# Patient Record
Sex: FEMALE | Race: AMERICAN INDIAN OR ALASKA NATIVE
[De-identification: names, ages, dates, MRNs, and addresses within clinical notes are randomized per-mention and may not be internally consistent; named-entity substitution may affect disease eponyms.]

---

## 2017-11-10 ENCOUNTER — HOSPITAL ENCOUNTER (EMERGENCY)
Dept: HOSPITAL 5 - ED | Age: 42
Discharge: HOME | End: 2017-11-10
Payer: COMMERCIAL

## 2017-11-10 VITALS — DIASTOLIC BLOOD PRESSURE: 80 MMHG | SYSTOLIC BLOOD PRESSURE: 110 MMHG
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
## 2017-11-10 DIAGNOSIS — F17.200: ICD-10-CM

## 2017-11-10 DIAGNOSIS — S61.212A: Primary | ICD-10-CM
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
## 2017-11-10 PROCEDURE — A6250 SKIN SEAL PROTECT MOISTURIZR: HCPCS

## 2017-11-10 PROCEDURE — 90471 IMMUNIZATION ADMIN: CPT

## 2017-11-10 PROCEDURE — 90715 TDAP VACCINE 7 YRS/> IM: CPT

## 2017-11-10 NOTE — EMERGENCY DEPARTMENT REPORT
ED Laceration HPI





- HPI


Chief Complaint: Extremity Injury, Upper


Stated Complaint: RIGHT FINGER LACERATION


Occurred When: Today


Location: Upper Extremity (right ring finger)


Severity: mild


Tetanus Status: Not up to Date


Laceration Symptoms: Yes Pain, No Foreign Body Sensation, No Numbness, No 

Weakness


Other History: 42 year old female presents to ED with right finger laceration 

after cutting her finger with kitchen knife at 11am. patient is stable, 

neurologically intact and in no acute distress. patient states her tetanus is 

up to date.





ED Review of Systems


ROS: 


Stated complaint: RIGHT FINGER LACERATION


Other details as noted in HPI





Constitutional: denies: chills, fever


Eyes: denies: eye pain, eye discharge, vision change


ENT: denies: ear pain, throat pain


Respiratory: denies: cough, shortness of breath, wheezing


Cardiovascular: denies: chest pain, palpitations


Endocrine: no symptoms reported


Gastrointestinal: denies: abdominal pain, nausea, diarrhea


Genitourinary: denies: urgency, dysuria, discharge


Musculoskeletal: denies: back pain, joint swelling, arthralgia


Skin: denies: rash, lesions


Neurological: denies: headache, weakness, numbness, paresthesias, confusion, 

abnormal gait, vertigo


Psychiatric: denies: anxiety, depression


Hematological/Lymphatic: denies: easy bleeding, easy bruising





ED Past Medical Hx





- Past Medical History


Previous Medical History?: No





- Surgical History


Additional Surgical History: FEET





- Social History


Smoking Status: Current Every Day Smoker





- Medications


Home Medications: 


 Home Medications











 Medication  Instructions  Recorded  Confirmed  Last Taken  Type


 


Cephalexin [Keflex] 500 mg PO Q12HR #6 cap 11/10/17  Unknown Rx














Laceration Physical Exam





- Exam


General: 


Vital signs noted. No distress. Alert and acting appropriately.





Laceration Location: Upper Extremity


Laceration Exam: Yes Normal Distal CMS, No Foreign Body, No Exposed Tendon, 

Vessel, or Nerve, No Tendon Injury





ED Course


 Vital Signs











  11/10/17





  12:24


 


Temperature 98.6 F


 


Pulse Rate 89


 


Respiratory 20





Rate 


 


Blood Pressure 109/70


 


O2 Sat by Pulse 100





Oximetry 














- Laceration /Wound Repair


  ** Right Anterior Proximal Palm Finger


Wound Location: upper extremity


Wound Length (cm): 3


Wound's Depth, Shape: linear


Wound Explored: clean


Irrigated w/ Saline (ccs): 50


Anesthesia: 1% Lidocaine


Volume Anesthetic (ccs): 5


Wound Repaired With: sutures


Suture Size/Type: 6:0


Number of Sutures: 4


Layer Closure?: No


Sterile Dressing Applied?: Yes


Progress: 





patient tolerated well








ED Medical Decision Making





- Medical Decision Making





42 year old female presents to ED with right finger laceration today after 

cutting self with knife while cooking. patient has tolerated sutures well. 

patient agrees and understands to return to ED for removal within 7-10 days. 

patient has had tetanus shot updated during ED visit. patient is stable, 

neurologically intact and in no acute distress. patient has full ROM of right 

finger and strong/intact radial pulse. patient has normal sensation in right 

hand. 


Critical care attestation.: 


If time is entered above; I have spent that time in minutes in the direct care 

of this critically ill patient, excluding procedure time.








ED Disposition


Clinical Impression: 


Laceration of finger, right


Qualifiers:


 Encounter type: initial encounter Finger: middle finger Damage to nail status: 

without damage Foreign body presence: without foreign body Qualified Code(s): 

S61.212A - Laceration without foreign body of right middle finger without 

damage to nail, initial encounter





Disposition: DC-01 TO HOME OR SELFCARE


Is pt being admited?: No


Does the pt Need Aspirin: No


Condition: Stable


Instructions:  Suture Care (ED)


Additional Instructions: 


Please return to ED within 7-10 days for suture removal. 


Prescriptions: 


Cephalexin [Keflex] 500 mg PO Q12HR #6 cap


Referrals: 


PRIMARY CARE,MD [Primary Care Provider] - 3-5 Days


Forms:  Work/School Release Form(ED)

## 2019-01-01 ENCOUNTER — HOSPITAL ENCOUNTER (EMERGENCY)
Dept: HOSPITAL 5 - ED | Age: 44
Discharge: HOME | End: 2019-01-01
Payer: SELF-PAY

## 2019-01-01 VITALS — SYSTOLIC BLOOD PRESSURE: 120 MMHG | DIASTOLIC BLOOD PRESSURE: 78 MMHG

## 2019-01-01 DIAGNOSIS — J02.9: Primary | ICD-10-CM

## 2019-01-01 PROCEDURE — 99282 EMERGENCY DEPT VISIT SF MDM: CPT

## 2019-01-01 NOTE — EMERGENCY DEPARTMENT REPORT
ED ENT HPI





- General


Chief complaint: Sore Throat


Stated complaint: BODY PAIN


Time Seen by Provider: 01/01/19 22:07


Source: patient


Mode of arrival: Ambulatory


Limitations: No Limitations





- History of Present Illness


Initial comments: 





43-year-old -American female comes in reporting that she has voice 

hoarseness hurts to talk difficulty swallowing and cough 1 week.  Patient 

reports that she has a productive cough of green and blood-tinged.  Patient 

reports she had a fever nausea and vomiting 2 days ago.  Patient reports that 

she's been taking over-the-counter NyQuil and Tylenol, teas and Advil without 

much resolution.


MD complaint: sore throat, difficulty swallowing


-: week(s) (1)


Location: throat





- Related Data


                                  Previous Rx's











 Medication  Instructions  Recorded  Last Taken  Type


 


cephALEXin [Keflex] 500 mg PO Q12HR #6 cap 11/10/17 Unknown Rx


 


Clindamycin [Clindamycin CAP] 300 mg PO Q8H #30 cap 01/01/19 Unknown Rx


 


Prednisone [predniSONE 5 mg (6-Day 5 mg PO .TAPER #1 tab.ds.pk 01/01/19 Unknown 

Rx





Pack, 21 Tabs)]    


 


traMADol [Ultram 50 MG tab] 50 mg PO Q6HR PRN #12 tablet 01/01/19 Unknown Rx











                                    Allergies











Allergy/AdvReac Type Severity Reaction Status Date / Time


 


aspirin Allergy  Hives Verified 11/10/17 15:14


 


coconut Allergy  Unknown Verified 01/01/19 21:55


 


cucumber Allergy  Unknown Verified 01/01/19 21:55


 


ibuprofen [From Motrin] Allergy  Unknown Verified 01/01/19 21:55














ED Dental HPI





- General


Chief complaint: Sore Throat


Stated complaint: BODY PAIN


Time Seen by Provider: 01/01/19 22:07


Source: patient


Mode of arrival: Ambulatory


Limitations: No Limitations





- Related Data


                                  Previous Rx's











 Medication  Instructions  Recorded  Last Taken  Type


 


cephALEXin [Keflex] 500 mg PO Q12HR #6 cap 11/10/17 Unknown Rx


 


Clindamycin [Clindamycin CAP] 300 mg PO Q8H #30 cap 01/01/19 Unknown Rx


 


Prednisone [predniSONE 5 mg (6-Day 5 mg PO .TAPER #1 tab.ds.pk 01/01/19 Unknown 

Rx





Pack, 21 Tabs)]    


 


traMADol [Ultram 50 MG tab] 50 mg PO Q6HR PRN #12 tablet 01/01/19 Unknown Rx











                                    Allergies











Allergy/AdvReac Type Severity Reaction Status Date / Time


 


aspirin Allergy  Hives Verified 11/10/17 15:14


 


coconut Allergy  Unknown Verified 01/01/19 21:55


 


cucumber Allergy  Unknown Verified 01/01/19 21:55


 


ibuprofen [From Motrin] Allergy  Unknown Verified 01/01/19 21:55














ED Review of Systems


ROS: 


Stated complaint: BODY PAIN


Other details as noted in HPI








ED Past Medical Hx





- Past Medical History


Previous Medical History?: No





- Surgical History


Past Surgical History?: Yes


Additional Surgical History: FEET





- Social History


Smoking Status: Never Smoker





- Medications


Home Medications: 


                                Home Medications











 Medication  Instructions  Recorded  Confirmed  Last Taken  Type


 


cephALEXin [Keflex] 500 mg PO Q12HR #6 cap 11/10/17  Unknown Rx


 


Clindamycin [Clindamycin CAP] 300 mg PO Q8H #30 cap 01/01/19  Unknown Rx


 


Prednisone [predniSONE 5 mg (6-Day 5 mg PO .TAPER #1 tab.ds.pk 01/01/19  Unknown

 Rx





Pack, 21 Tabs)]     


 


traMADol [Ultram 50 MG tab] 50 mg PO Q6HR PRN #12 tablet 01/01/19  Unknown Rx














ED Physical Exam





- General


Limitations: No Limitations


General appearance: alert, in no apparent distress





- Head


Head exam: Present: atraumatic, normocephalic





- Eye


Eye exam: Present: normal appearance, EOMI





- Expanded ENT Exam


  ** Expanded


Mouth exam: Present: muffled voice





- Neck


Neck exam: Present: tenderness (very tender to palpate guarding), full ROM, 

lymphadenopathy





- Respiratory


Respiratory exam: Present: normal lung sounds bilaterally.  Absent: respiratory 

distress





- Cardiovascular


Cardiovascular Exam: Present: regular rate, normal rhythm.  Absent: systolic 

murmur, diastolic murmur, rubs, gallop





- Neurological Exam


Neurological exam: Present: alert, oriented X3





- Psychiatric


Psychiatric exam: Present: normal affect, normal mood





- Skin


Skin exam: Present: warm, dry, intact, normal color.  Absent: rash





ED Course


                                   Vital Signs











  01/01/19 01/01/19





  21:44 21:49


 


Temperature 98.3 F 98.3 F


 


Pulse Rate 85 84


 


Respiratory 18 18





Rate  


 


Blood Pressure 120/78 120/78


 


O2 Sat by Pulse 98 98





Oximetry  














ED Medical Decision Making





- Medical Decision Making





Patient has been evaluated by this provider in fast track.


Patient was given ibuprofen 600 mg for pain management


I discussed the patient my concern for wanted to do a CT of her neck concerned 

that she has swelling and tenderness to her neck and guarding on my examination.

 Patient also had a fever earlier this week and this is been going on for 1 

week.


Patient declined having any further studies.  I will cover patient with 

clindamycin steroids and tramadol for pain management.  Have given patient 

information to follow up with her ear nose and throat provider.  Given patient's

precautions to return back to the emergency room if her symptoms persist or gets

worse.


Critical care attestation.: 


If time is entered above; I have spent that time in minutes in the direct care 

of this critically ill patient, excluding procedure time.








ED Disposition


Clinical Impression: 


 Acute sore throat





Disposition: DC-01 TO HOME OR SELFCARE


Is pt being admited?: No


Does the pt Need Aspirin: No


Condition: Stable


Instructions:  Pharyngitis (ED)


Additional Instructions: 


Please complete antibiotics as prescribed.  Steroids as prescribed and pain 

medication as needed.  It's very important for him to follow up with a provider 

if his symptoms persist or gets worse.


Prescriptions: 


Clindamycin [Clindamycin CAP] 300 mg PO Q8H #30 cap


Prednisone [predniSONE 5 mg (6-Day Pack, 21 Tabs)] 5 mg PO .TAPER #1 tab.ds.pk


traMADol [Ultram 50 MG tab] 50 mg PO Q6HR PRN #12 tablet


 PRN Reason: Pain , Severe (7-10)


Referrals: 


CATHY GARRIDO MD [Staff Physician] - 3-5 Days


Forms:  Accompanied Note, Work/School Release Form(ED)

## 2022-03-26 ENCOUNTER — HOSPITAL ENCOUNTER (EMERGENCY)
Dept: HOSPITAL 5 - ED | Age: 47
Discharge: HOME | End: 2022-03-26
Payer: SELF-PAY

## 2022-03-26 VITALS — SYSTOLIC BLOOD PRESSURE: 110 MMHG | DIASTOLIC BLOOD PRESSURE: 66 MMHG
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
## 2022-03-26 DIAGNOSIS — Z88.6: ICD-10-CM

## 2022-03-26 DIAGNOSIS — S82.832A: Primary | ICD-10-CM
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
## 2022-03-26 DIAGNOSIS — Z88.5: ICD-10-CM

## 2022-03-26 DIAGNOSIS — Z91.018: ICD-10-CM

## 2022-03-26 DIAGNOSIS — Z79.899: ICD-10-CM
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
## 2022-03-26 PROCEDURE — 99283 EMERGENCY DEPT VISIT LOW MDM: CPT

## 2022-03-26 NOTE — EMERGENCY DEPARTMENT REPORT
<ALYSON EDUARDO - Last Filed: 03/26/22 11:16>





ED Lower Extremity HPI





- General


Chief Complaint: Fall


Stated Complaint: FELL RT ANKLE INJURY


Time Seen by Provider: 03/26/22 10:13


Source: patient


Mode of arrival: Ambulatory


Limitations: No Limitations





- History of Present Illness


Initial Comments: 





46-year-old female presents to the ER today with complaints of left ankle pain 

and injury.  Patient states that yesterday afternoon around 6 PM her dog 

accidentally ran across her and she tripped over the dog causing her to fall.  

She states that when she fell she twisted her ankle and she felt a pop.  She has

been she is been having increased pain and swelling to the left ankle.  She 

reports inability to bear weight on that ankle due to pain.  She did take 

Tylenol without much relief.  She denies any prior injury or surgical procedures

to the right ankle.


MD Complaint: ankle injury





- Related Data


                                  Previous Rx's











 Medication  Instructions  Recorded  Last Taken  Type


 


oxyCODONE /ACETAMINOPHEN [Percocet 1 tab PO Q4HR PRN #12 tab 03/26/22 Unknown Rx





5/325]    











                                    Allergies











Allergy/AdvReac Type Severity Reaction Status Date / Time


 


aspirin Allergy  Hives Verified 11/10/17 15:14


 


coconut Allergy  Unknown Verified 01/01/19 21:55


 


cucumber Allergy  Unknown Verified 01/01/19 21:55


 


ibuprofen [From Motrin] Allergy  Unknown Verified 01/01/19 21:55














ED Review of Systems


Comment: All other systems reviewed and negative


Respiratory: denies: cough, shortness of breath, SOB with exertion, SOB at rest,

wheezing


Cardiovascular: denies: chest pain, palpitations


Musculoskeletal: joint swelling, arthralgia


Skin: denies: rash, lesions, change in color, change in hair/nails, pruritus


Neurological: abnormal gait.  denies: headache, weakness, numbness, 

paresthesias, confusion


Psychiatric: denies: anxiety, depression, auditory hallucinations, visual 

hallucinations, homicidal thoughts, suicidal thoughts


Hematological/Lymphatic: denies: easy bleeding, easy bruising, swollen glands





ED Past Medical Hx





- Surgical History


Additional Surgical History: FEET





- Social History


Smoking Status: Never Smoker





- Medications


Home Medications: 


                                Home Medications











 Medication  Instructions  Recorded  Confirmed  Last Taken  Type


 


oxyCODONE /ACETAMINOPHEN [Percocet 1 tab PO Q4HR PRN #12 tab 03/26/22  Unknown 

Rx





5/325]     














ED Physical Exam





- General


Limitations: No Limitations


General appearance: alert, in no apparent distress





- Head


Head exam: Present: atraumatic, normocephalic, normal inspection





- Respiratory


Respiratory exam: Present: normal lung sounds bilaterally.  Absent: respiratory 

distress, wheezes, rales, rhonchi





- Cardiovascular


Cardiovascular Exam: Present: regular rate, normal rhythm, normal heart sounds





- Expanded Lower Extremity Exam


  ** Left


Ankle exam: Present: tenderness (Diffuse tenderness to the left ankle but more 

so medial aspect), swelling (Mild to moderate swelling mainly to the medial 

aspect of the ankle).  Absent: full ROM (Range of motion of the ankle reduced 

due to pain), abrasion, laceration, ecchymosis, deformity, crepidus, 

dislocation, erythema, anterior draw sign


Foot/Toe exam: Absent: tenderness, swelling, abrasion, laceration, ecchymosis, 

deformity, crepidus, dislocation, erythema, amputation, puncture wound, foreign 

body, tenderness at base of 5th metatarsal


Neuro vascular tendon exam: Present: no vascular compromise.  Absent: pulse 

deficit, abnormal cap refill, motor deficit, sensory deficit, tendon deficit


Gait: Positive: not tested/not observed (Due to pain)





- Neurological Exam


Neurological exam: Present: alert, oriented X3, CN II-XII intact





- Psychiatric


Psychiatric exam: Present: normal affect, normal mood





- Skin


Skin exam: Present: intact





- Orthopedic Splinting/Casting


  ** Injury #1


Side: left


Lower Extremity Injury Location: ankle


Lower Extremity Immobilizer: posterior splint


Other Orthopedic Equipment: crutches


Additional Comments: 





Patient tolerated procedure without any complication.  Post splint placement 

shows patient is neurovascular intact.





ED Lower Extremity MDM





- Radiology Data


Radiology results: report reviewed


Patient Name: BRANDON AMES


Patient ID: [id number]


Gender: Female


YOB: 1975


Home Phone: [phone]


Referring Provider: ALYSON EDUARDO


Organization: [facility]


Accession Number: [id number]


Requested Date: March 26, 2022 10:16


Report Status: Final


Requested Procedure: 1


Procedure Description: XR ankle 3+V LT


Modality: XR


Findings


Reporting MD: Kings Tracey


Dictation Time: March 26, 2022 09:38


: Not available


Transcription Date:


LEFT ANKLE 3 VIEWS 1033 


INDICATION: Fall/pain/injury 


COMPARISON: None available. 


FINDINGS: Prominent soft tissue swelling is seen, particularly medially. An 

oblique fracture is seen through the metaphysis of the


fibula with posterior displacement and angulation as well as mild overriding. 

The tibia is subluxed medially in respect to the talus


though I do not clearly see a tibial fracture. A screw is seen in the great toe.




Signer Name: Kings Tracey MD 


Signed: 3/26/2022 9:38 AM 


Workstation Name: [email or web address]








- Medical Decision Making





1116: Xray reviewed and shows Prominent soft tissue swelling is seen, 

particularly medially. An oblique fracture is seen through the metaphysis of the


fibula with posterior displacement and angulation as well as mild overriding. 

The tibia is subluxed medially in respect to the talus


though I do not clearly see a tibial fracture. A screw is seen in the great toe.




Discussed results with patient.  She was placed in a short posterior splint and 

given crutches.  Post splint placement shows neurovascular intact.  Patient 

informed to follow-up with orthopedic specialist.  Patient expressed 

understanding agree with plan.  Patient stable at discharge.





ED Disposition


Clinical Impression: 


 Fracture of distal fibula





Disposition: 01 HOME / SELF CARE / HOMELESS


Is pt being admited?: No


Does the pt Need Aspirin: No


Condition: Stable


Instructions:  Tibial and Fibular Fractures


Additional Instructions: 


Do not remove the splint or get it wet.  Use a crutch to help you ambulate.  Try

to avoid bearing weight on that ankle.  Elevate your leg as often as possible.  

Take the pain medication as prescribed.  Follow-up with orthopedic specialist 

next week.  Return to the ER if your symptoms changes or worsens in any way.


Prescriptions: 


HYDROcodone/APAP 5-325 [[place] 5/325] 1 each PO Q4HR PRN #10 tablet


 PRN Reason: Pain


Referrals: 


CESAR PARRY MD [Primary Care Provider] - 3-5 Days


DONNA KEMP MD [Staff Physician] - 3-5 Days (Orthopedic Specialist)


Forms:  Work/School Release Form(ED)


Time of Disposition: 11:12





<CAROLE AGEE - Last Filed: 03/26/22 11:44>





ED Review of Systems


ROS: 


Stated complaint: FELL RT ANKLE INJURY


Other details as noted in HPI








ED Course





                                   Vital Signs











  03/26/22





  09:59


 


Temperature 98.6 F


 


Pulse Rate 100 H


 


Respiratory 20





Rate 


 


Blood Pressure 110/66





[Right] 


 


O2 Sat by Pulse 97





Oximetry 














ED Lower Extremity MDM





- Medical Decision Making


Patient evaluated by myself at the bedside after splint application.  Patient 

states she has had swelling, pain, and numbness since the fall last night.  

Patient looked up her injury on YouTube and suspected she had a strain and 

therefore was putting pressure on it throughout the night.  After splint 

application patient still endorses numbness and pain which was present before 

splint application.  Patient received Norco 5 mg without significant improvement

 prior to my evaluation.  I instructed Alyson to provide Percocet and prescribed

 Percocet for discharge.  


Critical care attestation.: 


If time is entered above; I have spent that time in minutes in the direct care 

of this critically ill patient, excluding procedure time.








ED Disposition


Is pt being admited?: No

## 2022-03-26 NOTE — XRAY REPORT
LEFT ANKLE 3 VIEWS 1033



INDICATION: Fall/pain/injury



COMPARISON: None available.



FINDINGS: Prominent soft tissue swelling is seen, particularly medially. An oblique fracture is seen 
through the metaphysis of the fibula with posterior displacement and angulation as well as mild overr
iding. The tibia is subluxed medially in respect to the talus though I do not clearly see a tibial fr
acture. A screw is seen in the great toe.







Signer Name: Kings Tracey MD 

Signed: 3/26/2022 10:38 AM

Workstation Name: [id number]